# Patient Record
Sex: FEMALE | Race: WHITE | NOT HISPANIC OR LATINO | Employment: FULL TIME | URBAN - METROPOLITAN AREA
[De-identification: names, ages, dates, MRNs, and addresses within clinical notes are randomized per-mention and may not be internally consistent; named-entity substitution may affect disease eponyms.]

---

## 2023-11-15 ENCOUNTER — HOSPITAL ENCOUNTER (EMERGENCY)
Facility: HOSPITAL | Age: 28
Discharge: HOME | End: 2023-11-15
Payer: COMMERCIAL

## 2023-11-15 VITALS
BODY MASS INDEX: 47.09 KG/M2 | WEIGHT: 293 LBS | OXYGEN SATURATION: 98 % | RESPIRATION RATE: 16 BRPM | SYSTOLIC BLOOD PRESSURE: 137 MMHG | HEART RATE: 87 BPM | TEMPERATURE: 99.1 F | DIASTOLIC BLOOD PRESSURE: 9 MMHG | HEIGHT: 66 IN

## 2023-11-15 DIAGNOSIS — L02.91 ABSCESS: Primary | ICD-10-CM

## 2023-11-15 PROCEDURE — 99285 EMERGENCY DEPT VISIT HI MDM: CPT

## 2023-11-15 PROCEDURE — 99283 EMERGENCY DEPT VISIT LOW MDM: CPT

## 2023-11-15 PROCEDURE — 99283 EMERGENCY DEPT VISIT LOW MDM: CPT | Performed by: NURSE PRACTITIONER

## 2023-11-15 RX ORDER — CEPHALEXIN 500 MG/1
500 CAPSULE ORAL 4 TIMES DAILY
Qty: 28 CAPSULE | Refills: 0 | Status: SHIPPED | OUTPATIENT
Start: 2023-11-15 | End: 2023-11-22

## 2023-11-15 RX ORDER — SULFAMETHOXAZOLE AND TRIMETHOPRIM 800; 160 MG/1; MG/1
1 TABLET ORAL 2 TIMES DAILY
Qty: 20 TABLET | Refills: 0 | Status: SHIPPED | OUTPATIENT
Start: 2023-11-15 | End: 2023-11-25

## 2023-11-15 ASSESSMENT — COLUMBIA-SUICIDE SEVERITY RATING SCALE - C-SSRS
1. IN THE PAST MONTH, HAVE YOU WISHED YOU WERE DEAD OR WISHED YOU COULD GO TO SLEEP AND NOT WAKE UP?: NO
2. HAVE YOU ACTUALLY HAD ANY THOUGHTS OF KILLING YOURSELF?: NO
6. HAVE YOU EVER DONE ANYTHING, STARTED TO DO ANYTHING, OR PREPARED TO DO ANYTHING TO END YOUR LIFE?: NO

## 2023-11-15 NOTE — ED PROVIDER NOTES
"Chief Complaint   Patient presents with   • Abscess       HPI       28 year old female presents to the Emergency Department today complaining of a raised firm area noted to the medial aspect of the left thigh. Reports that the area started out as a \"pimple.\" Attempted to manually express the area, but was unsuccessful. Denies any associated fever, chills, headache, neck pain, chest pain, shortness of breath, abdominal pain, nausea, vomiting, diarrhea, constipation, or urinary symptoms.       History provided by:  Patient             Patient History   No past medical history on file.  No past surgical history on file.  No family history on file.  Social History     Tobacco Use   • Smoking status: Not on file   • Smokeless tobacco: Not on file   Substance Use Topics   • Alcohol use: Not on file   • Drug use: Not on file           Physical Exam  Skin:     Comments: Firm indurated area to the left medial thigh that is approximately the size of a golf ball without a central area of fluctuance.          Labs Reviewed - No data to display    No orders to display            ED Course & MDM   Diagnoses as of 11/15/23 6229   Abscess           Medical Decision Making  Patient was seen and evaluated by myself. The area is not drainable at this time. Encouraged to soak the area in warm soapy water for 20 minutes at least 4-5 times daily. Given prescriptions for Bactrim and Keflex. Follow up with their doctor in 3 days. Return if worse in any way. Discharged in stable condition with computer instructions.    Diagnostic Impression:     1. Acute abscess to the left thigh    2. Prescription therapy             Your medication list      You have not been prescribed any medications.           Procedure  Procedures     Kwabena Foley, APRN-CNP  11/15/23 4407    "

## 2025-01-20 ENCOUNTER — APPOINTMENT (OUTPATIENT)
Dept: RADIOLOGY | Facility: HOSPITAL | Age: 30
End: 2025-01-20

## 2025-01-20 ENCOUNTER — HOSPITAL ENCOUNTER (EMERGENCY)
Facility: HOSPITAL | Age: 30
Discharge: HOME | End: 2025-01-20

## 2025-01-20 VITALS
RESPIRATION RATE: 16 BRPM | HEART RATE: 87 BPM | OXYGEN SATURATION: 99 % | DIASTOLIC BLOOD PRESSURE: 86 MMHG | SYSTOLIC BLOOD PRESSURE: 136 MMHG | BODY MASS INDEX: 43.39 KG/M2 | TEMPERATURE: 97.9 F | WEIGHT: 270 LBS | HEIGHT: 66 IN
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
## 2025-01-20 DIAGNOSIS — S06.0X0A CONCUSSION WITHOUT LOSS OF CONSCIOUSNESS, INITIAL ENCOUNTER: ICD-10-CM

## 2025-01-20 PROCEDURE — 2500000001 HC RX 250 WO HCPCS SELF ADMINISTERED DRUGS (ALT 637 FOR MEDICARE OP): Performed by: PHYSICIAN ASSISTANT

## 2025-01-20 PROCEDURE — 99284 EMERGENCY DEPT VISIT MOD MDM: CPT | Mod: 25

## 2025-01-20 PROCEDURE — 73564 X-RAY EXAM KNEE 4 OR MORE: CPT | Mod: BILATERAL PROCEDURE | Performed by: RADIOLOGY

## 2025-01-20 PROCEDURE — 73562 X-RAY EXAM OF KNEE 3: CPT | Mod: 50

## 2025-01-20 PROCEDURE — 70450 CT HEAD/BRAIN W/O DYE: CPT

## 2025-01-20 PROCEDURE — 73590 X-RAY EXAM OF LOWER LEG: CPT | Mod: 50

## 2025-01-20 PROCEDURE — 70450 CT HEAD/BRAIN W/O DYE: CPT | Performed by: RADIOLOGY

## 2025-01-20 PROCEDURE — 73590 X-RAY EXAM OF LOWER LEG: CPT | Mod: BILATERAL PROCEDURE | Performed by: RADIOLOGY

## 2025-01-20 RX ORDER — ACETAMINOPHEN 500 MG
1000 TABLET ORAL EVERY 8 HOURS PRN
Qty: 30 TABLET | Refills: 0 | Status: SHIPPED | OUTPATIENT
Start: 2025-01-20 | End: 2025-01-30

## 2025-01-20 RX ORDER — METHOCARBAMOL 500 MG/1
1000 TABLET, FILM COATED ORAL ONCE
Status: COMPLETED | OUTPATIENT
Start: 2025-01-20 | End: 2025-01-20

## 2025-01-20 RX ORDER — IBUPROFEN 600 MG/1
600 TABLET ORAL EVERY 6 HOURS PRN
Qty: 28 TABLET | Refills: 0 | Status: SHIPPED | OUTPATIENT
Start: 2025-01-20 | End: 2025-01-27

## 2025-01-20 RX ORDER — CYCLOBENZAPRINE HCL 10 MG
10 TABLET ORAL 3 TIMES DAILY PRN
Qty: 21 TABLET | Refills: 0 | Status: SHIPPED | OUTPATIENT
Start: 2025-01-20 | End: 2025-01-27

## 2025-01-20 RX ORDER — ACETAMINOPHEN 325 MG/1
975 TABLET ORAL ONCE
Status: COMPLETED | OUTPATIENT
Start: 2025-01-20 | End: 2025-01-20

## 2025-01-20 RX ADMIN — ACETAMINOPHEN 975 MG: 325 TABLET ORAL at 17:42

## 2025-01-20 RX ADMIN — METHOCARBAMOL 1000 MG: 500 TABLET ORAL at 17:42

## 2025-01-20 ASSESSMENT — PAIN - FUNCTIONAL ASSESSMENT: PAIN_FUNCTIONAL_ASSESSMENT: 0-10

## 2025-01-20 ASSESSMENT — PAIN SCALES - GENERAL
PAINLEVEL_OUTOF10: 10 - WORST POSSIBLE PAIN
PAINLEVEL_OUTOF10: 9

## 2025-01-20 ASSESSMENT — LIFESTYLE VARIABLES
HAVE PEOPLE ANNOYED YOU BY CRITICIZING YOUR DRINKING: NO
HAVE YOU EVER FELT YOU SHOULD CUT DOWN ON YOUR DRINKING: NO
TOTAL SCORE: 0
EVER FELT BAD OR GUILTY ABOUT YOUR DRINKING: NO
EVER HAD A DRINK FIRST THING IN THE MORNING TO STEADY YOUR NERVES TO GET RID OF A HANGOVER: NO

## 2025-01-20 ASSESSMENT — COLUMBIA-SUICIDE SEVERITY RATING SCALE - C-SSRS
1. IN THE PAST MONTH, HAVE YOU WISHED YOU WERE DEAD OR WISHED YOU COULD GO TO SLEEP AND NOT WAKE UP?: NO
6. HAVE YOU EVER DONE ANYTHING, STARTED TO DO ANYTHING, OR PREPARED TO DO ANYTHING TO END YOUR LIFE?: NO
2. HAVE YOU ACTUALLY HAD ANY THOUGHTS OF KILLING YOURSELF?: NO

## 2025-01-20 ASSESSMENT — PAIN DESCRIPTION - PROGRESSION: CLINICAL_PROGRESSION: NOT CHANGED

## 2025-01-20 NOTE — Clinical Note
Bhargavi Haro was seen and treated in our emergency department on 1/20/2025.  She may return to work on 01/23/2025.       If you have any questions or concerns, please don't hesitate to call.      Rossana Westfall PA-C

## 2025-01-20 NOTE — DISCHARGE INSTRUCTIONS
CT of your head and Xrays of your legs are normal - no broken bones, no bleeding.  Take pain medication as needed. Rest, Ice, Compress, and Elevate your injury as often as possible.    Please call [phone] for Primary Care referral for follow up appointments.

## 2025-01-20 NOTE — ED PROVIDER NOTES
Emergency Department Encounter  [facility] EMERGENCY MEDICINE    Patient: Bhargavi Haro Sr.  MRN: [id number]  : 1995  Date of Evaluation: 2025  ED Provider: Rossana Westfall PA-C      Chief Complaint       Chief Complaint   Patient presents with    Fall    Knee Pain     HPI    Bhargavi Haro Sr. is a 29 y.o. female who presents to the emergency department presenting for evaluation after mechanical fall sustained this morning. Patient identifies with he/him pronouns - reports he was getting off bus and fell to the ground. Having bilateral knee pain and headache. Did strike his head, does not believe he lost consciousness.  Does not take any blood thinners.  Endorses that he went to work where his pain persisted and he presents to the ED for evaluation.  Has not yet taken any pain medication prior to arrival.  No thunderclap onset of headache, not the worst headache of his life, however endorses a moderate 7/10 generalized headache.  No associated fevers, chills, dizziness, abdominal pain, nausea or vomiting, numbness or tingling, weakness, inability to ambulate, focal neck or back pain.  Denies current pregnancy.    ROS:     Review of Systems  14 systems reviewed and otherwise acutely negative except as in the HPI.    Past History   No past medical history on file.  No past surgical history on file.  Social History     Socioeconomic History    Marital status:      Social Drivers of Health     Financial Resource Strain: Low Risk  (2024)    Received from [employer]    Overall Financial Resource Strain (CARDIA)     Difficulty of Paying Living Expenses: Not very hard   Food Insecurity: Food Insecurity Present (2024)    Received from [employer]    Hunger Vital Sign     Worried About Running Out of Food in the Last Year: Sometimes true     Ran Out of Food in the Last Year: Sometimes true   Transportation Needs: No Transportation Needs (2024)    Received from [employer]     PRAPARE - Transportation     Lack of Transportation (Medical): No     Lack of Transportation (Non-Medical): No   Physical Activity: Sufficiently Active (6/17/2024)    Received from [employer]    Exercise Vital Sign     Days of Exercise per Week: 5 days     Minutes of Exercise per Session: 30 min   Stress: No Stress Concern Present (6/17/2024)    Received from [employer]    [employer] - Occupational Stress Questionnaire     Feeling of Stress : Not at all   Social Connections: Socially Isolated (6/17/2024)    Received from [employer]    Social Connection and Isolation Panel [NHANES]     Frequency of Communication with Friends and Family: Never     Frequency of Social Gatherings with Friends and Family: Never     Attends Restorationist Services: Never     Active Member of Clubs or Organizations: No     Attends Club or Organization Meetings: Never     Marital Status: Living with partner   Intimate Partner Violence: Not At Risk (6/17/2024)    Received from [employer]    Humiliation, Afraid, Rape, and Kick questionnaire     Fear of Current or Ex-Partner: No     Emotionally Abused: No     Physically Abused: No     Sexually Abused: No   Housing Stability: High Risk (6/17/2024)    Received from [employer]    Housing Stability Vital Sign     Unable to Pay for Housing in the Last Year: Yes     Number of Times Moved in the Last Year: 0     Homeless in the Last Year: No       Medications/Allergies     Previous Medications    No medications on file     Allergies   Allergen Reactions    Lemon Flavor Anaphylaxis     States is newly allergic to larisa. Throat swelled and couldn't breath    Milk Containing Products (Dairy) Unknown, Nausea/vomiting and Hives     Other reaction(s): Other (Comments)   Stomach upset and headache     Other reaction(s): Other (Comments)   States stomach upset and headache    Stomach upset and headache     States stomach upset and headache     Stomach upset and headache     Potassium Citrate Headache        Physical Exam       ED Triage Vitals [01/20/25 1642]   Temperature Heart Rate Respirations BP   36.6 °C (97.9 °F) 87 16 136/86      Pulse Ox Temp src Heart Rate Source Patient Position   99 % -- -- --      BP Location FiO2 (%)     -- --         Physical Exam    Physical Exam:     VS: As documented in the triage note and EMR flowsheet from this visit were reviewed.    Appearance: Alert, oriented, cooperative, in no acute distress. Well nourished & well hydrated.    Skin: Warm, intact and dry. Small hemostatic abrasion to anterior R knee. +ecchymosis to anterior L knee.    Head: normocephalic, atraumatic. No cephalohematoma. No septal hematoma or hemotympanum. Non-mobile facial structures. PERRLA, EOMs intact. No pain with EOMs. No nystagmus. Bilateral conjunctivae pink without injection or exudates. No hyphema or subconjunctival hemorrhage. Hearing grossly intact. External auditory canals patent, tympanic membranes intact with visible landmarks. Nares patent, mucus membranes moist. Pharynx clear, uvula midline and non-edematous. No drooling, dysphagia or trismus. Voice non-muffled.    Neck: Supple, without midline or paraspinal tenderness    Pulmonary: Clear bilaterally with good chest wall excursion. No rales, rhonchi or wheezing. No accessory muscle use or stridor.    Cardiac: Normal S1, S2.    Abdomen: Soft, nontender.    Musculoskeletal: Spontaneously moving all extremities without limitation. No midline tenderness. Extremities warm and well-perfused, capillary refill less than 2 seconds. Pulses full and equal. Full AROM bilateral lower extremities. No focal proximal tibial TTP. No lower extremity erythema, edema or increased warmth.     Neurological:  Cranial nerves II through XII are grossly intact, finger-nose touch is normal, normal sensation, no weakness, no focal findings identified. Ambulating without assistance with steady gait, non-ataxic.    Psychiatric: Appropriate  "mood and affect. Kempt appearance.      Diagnostics   Radiographs:  CT head wo IV contrast   Final Result   No acute findings or intracranial mass.        No calvarial fracture.        MACRO:   None             Signed by: Cheo Rao 1/20/2025 6:21 PM   Dictation workstation:   [id number]      XR knee 3 views bilateral         XR tibia fibula bilateral 2 views           ED Course   Visit Vitals  /86   Pulse 87   Temp 36.6 °C (97.9 °F)   Resp 16   Ht 1.676 m (5' 6\")   Wt 122 kg (270 lb)   SpO2 99%   BMI 43.58 kg/m²   BSA 2.38 m²     Medications   acetaminophen (Tylenol) tablet 975 mg (975 mg oral Given 1/20/25 1742)   methocarbamol (Robaxin) tablet 1,000 mg (1,000 mg oral Given 1/20/25 1742)       Medical Decision Making   Given oral tylenol and robaxin for pain, pending Xrs to assess for possible occult fractures, as well as CTH given reported head trauma with subjective moderate headache. +NVI.  No acute traumatic findings on CT head and/or x-rays of the lower extremities.  Results are discussed with the patient at bedside.  He is given concussion precautions as well as a work note for rest.  Instructed to RICE his injuries.  Given prescriptions for Motrin, Tylenol, and Flexeril as needed. Please call [phone] for Primary Care referral for follow up appointments.    Final Impression      1. Fall, initial encounter    2. Concussion without loss of consciousness, initial encounter          DISPOSITION  Disposition: discharge  Patient condition is: Stable    Comment: Please note this report has been produced using speech recognition software and may contain errors related to that system including errors in grammar, punctuation, and spelling, as well as words and phrases that may be inappropriate.  If there are any questions or concerns please feel free to contact the dictating provider for clarification.    ARIN Prater PA-C  01/20/25 0889    "